# Patient Record
Sex: MALE | Race: WHITE | NOT HISPANIC OR LATINO | Employment: FULL TIME | URBAN - METROPOLITAN AREA
[De-identification: names, ages, dates, MRNs, and addresses within clinical notes are randomized per-mention and may not be internally consistent; named-entity substitution may affect disease eponyms.]

---

## 2020-05-20 ENCOUNTER — OUTSIDE PLACE OF SERVICE (OUTPATIENT)
Dept: SURGERY | Facility: CLINIC | Age: 35
End: 2020-05-20
Payer: COMMERCIAL

## 2020-05-20 ENCOUNTER — OUTSIDE PLACE OF SERVICE (OUTPATIENT)
Dept: SURGERY | Facility: CLINIC | Age: 35
End: 2020-05-20

## 2020-05-20 PROCEDURE — 99222 1ST HOSP IP/OBS MODERATE 55: CPT | Mod: 57,,, | Performed by: SURGERY

## 2020-05-20 PROCEDURE — 44970 PR LAP,APPENDECTOMY: ICD-10-PCS | Mod: AS,,, | Performed by: REGISTERED NURSE

## 2020-05-20 PROCEDURE — 44970 LAPAROSCOPY APPENDECTOMY: CPT | Mod: ,,, | Performed by: SURGERY

## 2020-05-20 PROCEDURE — 44970 LAPAROSCOPY APPENDECTOMY: CPT | Mod: AS,,, | Performed by: REGISTERED NURSE

## 2020-05-20 PROCEDURE — 99222 PR INITIAL HOSPITAL CARE,LEVL II: ICD-10-PCS | Mod: 57,,, | Performed by: SURGERY

## 2020-05-20 PROCEDURE — 44970 PR LAP,APPENDECTOMY: ICD-10-PCS | Mod: ,,, | Performed by: SURGERY

## 2020-05-21 ENCOUNTER — OUTSIDE PLACE OF SERVICE (OUTPATIENT)
Dept: SURGERY | Facility: CLINIC | Age: 35
End: 2020-05-21

## 2020-05-21 ENCOUNTER — OUTSIDE PLACE OF SERVICE (OUTPATIENT)
Dept: SURGERY | Facility: CLINIC | Age: 35
End: 2020-05-21
Payer: COMMERCIAL

## 2020-06-08 ENCOUNTER — OFFICE VISIT (OUTPATIENT)
Dept: SURGERY | Facility: CLINIC | Age: 35
End: 2020-06-08
Payer: COMMERCIAL

## 2020-06-08 VITALS
BODY MASS INDEX: 28.28 KG/M2 | RESPIRATION RATE: 16 BRPM | OXYGEN SATURATION: 98 % | HEIGHT: 71 IN | DIASTOLIC BLOOD PRESSURE: 87 MMHG | HEART RATE: 75 BPM | WEIGHT: 202 LBS | SYSTOLIC BLOOD PRESSURE: 127 MMHG

## 2020-06-08 DIAGNOSIS — Z98.890 STATUS POST SURGERY: Primary | ICD-10-CM

## 2020-06-08 PROCEDURE — 99024 PR POST-OP FOLLOW-UP VISIT: ICD-10-PCS | Mod: S$GLB,,, | Performed by: REGISTERED NURSE

## 2020-06-08 PROCEDURE — 99024 POSTOP FOLLOW-UP VISIT: CPT | Mod: S$GLB,,, | Performed by: REGISTERED NURSE

## 2020-06-08 NOTE — PROGRESS NOTES
"Robles Hernadez is a 35 y.o. male patient.   No diagnosis found.  History reviewed. No pertinent past medical history.  No past surgical history pertinent negatives on file.  Scheduled Meds:  Continuous Infusions:  PRN Meds:    Review of patient's allergies indicates:  No Known Allergies  There are no hospital problems to display for this patient.    Blood pressure 127/87, pulse 75, resp. rate 16, height 5' 11" (1.803 m), weight 91.6 kg (202 lb), SpO2 98 %.    Subjective:   Diet: Adequate intake (Patient having normal bowel movements and passing gas without complications.).  Patient reports no nausea or vomiting.    Activity level: Normal.    Pain control: Well controlled.    Wound: Subjective wound complaints: No complaints.      Objective:  Vital signs (most recent): Blood pressure 127/87, pulse 75, resp. rate 16, height 5' 11" (1.803 m), weight 91.6 kg (202 lb), SpO2 98 %.  General appearance: Comfortable, well-appearing, in no acute distress and not in pain.    Lungs:  Breath sounds normal.    Heart: Normal rate.    Abdomen: Abdomen is soft.    Wound:  Clean.  There is no drainage.    Extremities: There is normal range of motion.    Neurological: The patient is alert and oriented to person, place and time.       Assessment:   Post-op: 19 days.      Plan:  Encourage ambulation (No lifting anything heavier than 30 pounds for an additional 3 weeks.).  Regular diet.  General Orders/Medications Plan: Follow up in my office on a prn basis.           Jhonatan Dominguez NP  6/8/2020  "